# Patient Record
Sex: MALE | Race: WHITE | Employment: OTHER | URBAN - METROPOLITAN AREA
[De-identification: names, ages, dates, MRNs, and addresses within clinical notes are randomized per-mention and may not be internally consistent; named-entity substitution may affect disease eponyms.]

---

## 2018-06-11 PROBLEM — R31.29 OTHER MICROSCOPIC HEMATURIA: Status: ACTIVE | Noted: 2017-10-16

## 2018-06-11 PROBLEM — H93.12 TINNITUS OF LEFT EAR: Status: ACTIVE | Noted: 2017-10-16

## 2019-06-17 PROBLEM — M19.049 LOCALIZED, PRIMARY OSTEOARTHRITIS OF HAND: Status: ACTIVE | Noted: 2019-06-17

## 2019-06-17 PROBLEM — M79.609 PAIN IN LIMB: Status: ACTIVE | Noted: 2019-06-17

## 2019-06-17 PROBLEM — M67.40 GANGLION OF JOINT: Status: ACTIVE | Noted: 2019-06-17

## 2019-06-17 PROBLEM — M72.0 DUPUYTREN'S DISEASE OF PALM: Status: ACTIVE | Noted: 2019-06-17

## 2021-08-03 PROBLEM — N52.9 IMPOTENCE: Status: RESOLVED | Noted: 2021-08-03 | Resolved: 2021-08-03

## 2021-08-17 PROBLEM — N48.6 PEYRONIE'S DISEASE: Status: ACTIVE | Noted: 2020-08-18

## 2021-08-17 PROBLEM — I25.10 ATHEROSCLEROSIS OF LEFT ANTERIOR DESCENDING (LAD) ARTERY: Status: ACTIVE | Noted: 2020-06-09

## 2021-08-17 PROBLEM — N20.0 KIDNEY STONES: Status: ACTIVE | Noted: 2020-08-18

## 2021-08-17 PROBLEM — M50.30 DDD (DEGENERATIVE DISC DISEASE), CERVICAL: Status: ACTIVE | Noted: 2020-06-11

## 2021-08-17 PROBLEM — Z86.16 HISTORY OF COVID-19: Status: ACTIVE | Noted: 2021-02-16

## 2021-08-17 PROBLEM — J44.9 MILD CHRONIC OBSTRUCTIVE PULMONARY DISEASE (HCC): Status: ACTIVE | Noted: 2020-02-13

## 2021-08-27 ENCOUNTER — HOSPITAL ENCOUNTER (OUTPATIENT)
Dept: PHYSICAL THERAPY | Age: 66
Discharge: HOME | End: 2021-08-27
Payer: MEDICARE

## 2021-08-27 PROCEDURE — 97161 PT EVAL LOW COMPLEX 20 MIN: CPT

## 2021-08-27 PROCEDURE — 97110 THERAPEUTIC EXERCISES: CPT

## 2021-08-27 NOTE — PROGRESS NOTES
PHYSICAL THERAPY - DAILY TREATMENT NOTE     Patient Name: Talha Tong        Date: 2021  : 1955   YES Patient  Verified  Visit #:   1     Insurance: Payor: Partbernardo Peaks / Plan: [place] MEDICARE PART A & B / Product Type: Medicare /      In time: 681 Out time: 275   Total Treatment Time: 35     Medicare/BCBS Time Tracking (below)   Total Timed Codes (min):  15 1:1 Treatment Time:  15     TREATMENT AREA =  Low back pain [M54.5]    SUBJECTIVE    Pain Level (on 0 to 10 scale):  7  / 10   Medication Changes/New allergies or changes in medical history, any new surgeries or procedures?     NO    If yes, update Summary List   Subjective Functional Status/Changes:  []  No changes reported     See eval /POC         OBJECTIVE  Modalities Rationale:     decrease pain to improve patient's ability to return to PLOF      min [] Estim, type/location:                                      []  att     []  unatt     []  w/US     []  w/ice    []  w/heat    min []  Mechanical Traction: type/lbs                   []  pro   []  sup   []  int   []  cont    []  before manual    []  after manual    min []  Ultrasound, settings/location:      min []  Iontophoresis w/ dexamethasone, location:                                               []  take home patch       []  in clinic    min []  Ice     []  Heat    location/position:     min []  Vasopneumatic Device, press/temp:     min []  Other:    [] Skin assessment post-treatment (if applicable):    []  intact    []  redness- no adverse reaction     []redness - adverse reaction:      15 min Therapeutic Exercise:  [x]  See flow sheet   Rationale:      increase ROM and increase strength to improve the patients ability to return to PLOF      min Manual Therapy: Technique:      [] S/DTM []IASTM []PROM [] Passive Stretching   []manual TPR    []Jt manipulation:Gr I [] II []  III [] IV[]  []REIL with manual OP  Treatment Area:     Rationale:      decrease pain, increase ROM, increase tissue extensibility and decrease trigger points to improve patient's ability to return to PLOF     min Neuromuscular Re-ed: [x]  See flow sheet   Rationale:      improve coordination, improve balance, increase proprioception and dec dizziness to improve the patients ability to return to PLOF       x min Self Care: Extension principle   Rationale:    increase ROM, increase strength and improve coordination to improve the patients ability to return to PLOF    Billed With/As:   [] TE   [] TA   [] Neuro   [] Self Care Patient Education: [x] Review HEP    [] Progressed/Changed HEP based on:   [] positioning   [] body mechanics   [] transfers   [] heat/ice application    [] other:        Other Objective/Functional Measures:    See eval/ POC     Post Treatment Pain Level (on 0 to 10) scale:   <7  / 10     ASSESSMENT    X  See POC     PLAN    [x]  Upgrade activities as tolerated {YES) Continue plan of care   []  Discharge due to :    []  Other:      Therapist: Aren Leal PT    Date: 8/27/2021 Time: 9:07 AM     Future Appointments   Date Time Provider Alessandra Redd   10/12/2021  4:00 PM Oseas Chowdary MD [facility] LISANDRO SCHED   8/23/2022  8:40 AM Joanna Fishman MD [id number]

## 2021-08-27 NOTE — PROGRESS NOTES
[address] [place] [address] PHYSICAL THERAPY AT [facility] [address], [address] - Phone: [phone]  Fax: [phone] / [facility]  Patient Name: Rajeev Guevara : 1955   Medical   Diagnosis: Low back pain [M54.5] Treatment Diagnosis: LBP   Onset Date: Aug 2021- 3 weeks prior to IE     Referral Source: Criss Santa MD Start of Care [facility]): 2021   Prior Hospitalization: See medical history Provider #: [id number]   Prior Level of Function: No back pain with ADLs   Comorbidities: na   Medications: Verified on Patient Summary List   The Plan of Care and following information is based on the information from the initial evaluation.   ================================================================  Assessment / key information: Rajeev Guevara is a 72 y.o.  yo male with Dx of Low back pain [M54.5]. He reports onset of LBP following prolonged period sitting on a bench. The next morning, he worke with significant pain and stiffness. Symptoms have progressed. He denies radic symptoms. Pain is worse in the morning and with sitting. On assessment, Ismael Dowell stands with flexed trunk posture. He demonstrates Lx ROM as follows: flex= 75%, ext= 50%, R ROT= WNL, L ROT= WNL. LE strength and ROM are WNLs. SLR test is negative. Repeated movement tests suggests directional preference for extension- indicating disc derangement. FOTO score= 48%.  ================================================================  Eval Complexity: History LOW Complexity : Zero comorbidities / personal factors that will impact the outcome / POC;  Examination  HIGH Complexity : 4+ Standardized tests and measures addressing body structure, function, activity limitation and / or participation in recreation ; Presentation MEDIUM Complexity : Evolving with changing characteristics ;   Decision Making MEDIUM Complexity : FOTO score of 26-74; Overall Complexity LOW   Problem List: pain affecting function, decrease ROM, decrease strength, impaired gait/ balance, decrease ADL/ functional abilitiies, decrease activity tolerance and decrease flexibility/ joint mobility   Treatment Plan may include any combination of the following: Therapeutic exercise, Therapeutic activities, Neuromuscular re-education, Physical agent/modality, Manual therapy and Patient education  Patient / Family readiness to learn indicated by: asking questions, trying to perform skills and interest  Persons(s) to be included in education: patient (P)  Barriers to Learning/Limitations: None  Measures taken, if barriers to learning:    Patient Goal (s): Pain relief   Patient self reported health status: excellent  Rehabilitation Potential: good   Short Term Goals: To be accomplished in  2  weeks:  1 Patient will report >= 25% improvement in symptoms with ADLs. 2 Patient will be educated in extension progression principles to alleviate symptoms. 3 Patient will demonstrate full upright posture.  Long Term Goals: To be accomplished in  4-6  weeks:  1 Patient to report >= 70% improvement in symptoms with ADLs. 2 Patient will be independent with finalized HEP/ self maintenance. 3 Increase FOTO score >=  70% to indicate improved function with use of LS. 4 Restore full AROM for improved ADL participation. Frequency / Duration:   Patient to be seen  2  times per week for 4-6  weeks:  Patient / Caregiver education and instruction: self care, activity modification and exercises    Therapist Signature: Liana Ya PT Date: 1/55/3278   Certification Period: 8/27/21 to 11/24/21 Time: 9:08 AM   ===================================================================  I certify that the above Physical Therapy Services are being furnished while the patient is under my care. I agree with the treatment plan and certify that this therapy is necessary.     Physician Signature:        Date:       Time:        José Miguel Irving MD  Please sign and return to [facility] or you may fax the signed copy to [phone]. Thank you.

## 2021-09-01 ENCOUNTER — HOSPITAL ENCOUNTER (OUTPATIENT)
Dept: PHYSICAL THERAPY | Age: 66
Discharge: HOME | End: 2021-09-01
Payer: MEDICARE

## 2021-09-01 PROCEDURE — 97110 THERAPEUTIC EXERCISES: CPT

## 2021-09-01 PROCEDURE — 97140 MANUAL THERAPY 1/> REGIONS: CPT

## 2021-09-01 NOTE — PROGRESS NOTES
PHYSICAL THERAPY - DAILY TREATMENT NOTE     Patient Name: Rajeev Guevara        Date: 2021  : 1955   YES Patient  Verified  Visit #:   2     Insurance: Payor: Carlos A Johnson / Plan: [place] MEDICARE PART A & B / Product Type: Medicare /      In time: 750 Out time: 830   Total Treatment Time: 40     Medicare/BCBS Time Tracking (below)   Total Timed Codes (min):   1:1 Treatment Time:  30     TREATMENT AREA =  Low back pain [M54.5]    SUBJECTIVE    Pain Level (on 0 to 10 scale):  4  / 10   Medication Changes/New allergies or changes in medical history, any new surgeries or procedures? NO    If yes, update Summary List   Subjective Functional Status/Changes:  []  No changes reported   Feel better. Saw ortho--\"have spine of a 26 yo)    Pain feel muscular--R LBP.   No buttock/ leg pain           OBJECTIVE  Modalities Rationale:     decrease pain and increase tissue extensibility to improve patient's ability to perform ADLs with less pain      min [] Estim, type/location:                                      []  att     []  unatt     []  w/US     []  w/ice    []  w/heat    min []  Mechanical Traction: type/lbs                   []  pro   []  sup   []  int   []  cont    []  before manual    []  after manual    min []  Ultrasound, settings/location:      min []  Iontophoresis w/ dexamethasone, location:                                               []  take home patch       []  in clinic   10 min []  Ice     [x]  Heat    location/position:     min []  Vasopneumatic Device, press/temp:              cold / med If using vaso       pre-treatment girth :       post-treatment girth :       measured at (location) :     min []  Other:    [x] Skin assessment post-treatment (if applicable):    [x]  intact    [x]  redness- no adverse reaction     []redness - adverse reaction:      15 min Manual Therapy: Technique:      [x] S/DTM []IASTM []PROM [] Passive Stretching   [x]manual TPR  [] SOR [] man traction  []Jt manipulation:Gr I [] II []  III [] IV[]   [x] OP with REIL    []   Treatment Area:  LB      *manual therapy interventions were performed at a separate and distinct time from   the therapeutic activities interventions. Rationale:      decrease pain, increase ROM, increase tissue extensibility and decrease trigger points to improve patient's ability to perform ADLs with less pain    15 min Therapeutic Exercise:  [x]  See flow sheet   Rationale:      increase ROM, increase strength and dec neural compromise to improve the patients ability to perform ADLs with less pain           Billed With/As:   [x] TE   [] TA   [] Neuro   [] Self Care Patient Education: [x] Review HEP    [] Progressed/Changed HEP based on:   [x] positioning   [x] body mechanics   [] transfers   [] heat/ice application    [] other:        Other Objective/Functional Measures:    Upright posture    Added hip stretch   Post Treatment Pain Level (on 0 to 10) scale:   0  / 10     ASSESSMENT  Assessment/Changes in Function:      Functional improvement:  improved posture  Functional limitation:  LBP with all ADLs           Patient will continue to benefit from skilled PT services to modify and progress therapeutic interventions, address functional mobility deficits, address ROM deficits, address strength deficits, analyze and address soft tissue restrictions, analyze and cue movement patterns, analyze and modify body mechanics/ergonomics and assess and modify postural abnormalities to attain remaining goals.    Progress toward goals / Updated goals:    Improved posture         []  See Progress Note/Recertification    PLAN    [x]  Upgrade activities as tolerated {YES) Continue plan of care   []  Discharge due to :    []  Other:      Therapist: Jordan Estes PT    Date: 9/1/2021 Time: 7:51 AM     Future Appointments   Date Time Provider Alessandra Redd   9/1/2021  8:00 AM Rogelio Cline PT [facility]   9/3/2021  8:00 AM Rogelio Cline PT [facility]   9/8/2021  8:00 AM Rodolfo Colla, PT [facility]   9/10/2021  8:00 AM Rodolfo Colla, PT [facility]   9/14/2021  8:30 AM Renuka Raymond, PTA [facility]   9/16/2021  8:00 AM Rodolfo Colla, PT [facility]   9/21/2021  8:00 AM Rodolfo Colla, PT [facility]   9/24/2021  8:15 AM Renuka Raymond, PTA [facility]   9/27/2021  8:00 AM Rodolfo Colla, PT [facility]   9/30/2021  8:00 AM Rodolfo Colla, PT [facility]   10/12/2021  4:00 PM Lg Cage MD [facility]   8/23/2022  8:40 AM Mary Kamara MD [facility]

## 2021-09-03 ENCOUNTER — HOSPITAL ENCOUNTER (OUTPATIENT)
Dept: PHYSICAL THERAPY | Age: 66
Discharge: HOME | End: 2021-09-03
Payer: MEDICARE

## 2021-09-03 PROCEDURE — 97140 MANUAL THERAPY 1/> REGIONS: CPT

## 2021-09-03 PROCEDURE — 97110 THERAPEUTIC EXERCISES: CPT

## 2021-09-03 NOTE — PROGRESS NOTES
PHYSICAL THERAPY - DAILY TREATMENT NOTE     Patient Name: Charlotte Guzmán        Date: 9/3/2021  : 1955   YES Patient  Verified  Visit #:   3     Insurance: Payor: Yaya Ely / Plan: [place] MEDICARE PART A & B / Product Type: Medicare /      In time: 207 Out time: 831   Total Treatment Time: 40     Medicare/BCBS Time Tracking (below)   Total Timed Codes (min):  30 1:1 Treatment Time:  30     TREATMENT AREA =  Low back pain [M54.5]    SUBJECTIVE    Pain Level (on 0 to 10 scale):  2  / 10   Medication Changes/New allergies or changes in medical history, any new surgeries or procedures? NO    If yes, update Summary List   Subjective Functional Status/Changes:  []  No changes reported   I can tie my shoes now. Doing much better.     No leg or buttock pain           OBJECTIVE  Modalities Rationale:     decrease pain and increase tissue extensibility to improve patient's ability to relax      min [] Estim, type/location:                                      []  att     []  unatt     []  w/US     []  w/ice    []  w/heat    min []  Mechanical Traction: type/lbs                   []  pro   []  sup   []  int   []  cont    []  before manual    []  after manual    min []  Ultrasound, settings/location:      min []  Iontophoresis w/ dexamethasone, location:                                               []  take home patch       []  in clinic   10 min []  Ice     [x]  Heat    location/position:     min []  Vasopneumatic Device, press/temp:              cold / med If using vaso       pre-treatment girth :       post-treatment girth :       measured at (location) :     min []  Other:    [x] Skin assessment post-treatment (if applicable):    [x]  intact    [x]  redness- no adverse reaction     []redness - adverse reaction:      10 min Manual Therapy: Technique:      [x] S/DTM []IASTM []PROM [] Passive Stretching   [x]manual TPR  [] SOR [] man traction  []Jt manipulation:Gr I [] II []  III [] IV[]   [x] OP with KELY    []   Treatment Area:  LB      *manual therapy interventions were performed at a separate and distinct time from   the therapeutic activities interventions. Rationale:      decrease pain, increase ROM, increase tissue extensibility and decrease trigger points to improve patient's ability to perform ADLs    20 min Therapeutic Exercise:  [x]  See flow sheet   Rationale:      increase ROM, increase strength and dec neural compromise to improve the patients ability to perform ADLs         Billed With/As:   [x] TE   [] TA   [] Neuro   [] Self Care Patient Education: [x] Review HEP    [] Progressed/Changed HEP based on:   [] positioning   [] body mechanics   [] transfers   [] heat/ice application    [] other:        Other Objective/Functional Measures:    Ext ROM= 75%    Improved mobility    Initiated core stability ex   Post Treatment Pain Level (on 0 to 10) scale:   2-3  / 10     ASSESSMENT  Assessment/Changes in Function:      Functional improvement:  ability to bend forward  Functional limitation:  extension principle precautions           Patient will continue to benefit from skilled PT services to modify and progress therapeutic interventions, address functional mobility deficits, address ROM deficits, address strength deficits, analyze and modify body mechanics/ergonomics and assess and modify postural abnormalities to attain remaining goals.    Progress toward goals / Updated goals:    Good Progress to    [] STG    [x] LTG  1 as shown by pt report of improvement         []  See Progress Note/Recertification    PLAN    [x]  Upgrade activities as tolerated {YES) Continue plan of care   []  Discharge due to :    []  Other:      Therapist: Fariba Savage PT    Date: 9/3/2021 Time: 7:54 AM     Future Appointments   Date Time Provider Alessandra Redd   9/3/2021  8:00 AM Junior Webster PT [facility]   9/8/2021  8:00 AM Junior Webster PT [facility]   9/10/2021  8:00 AM Junior Webster PT [facility] 9/14/2021  8:30 AM Zoltan Lara PTA [facility]   9/16/2021  8:00 AM Lance Santacruz, PT [facility]   9/21/2021  8:00 AM Lance Santacruz, PT [facility]   9/24/2021  8:15 AM Zoltan Lara PTA [facility]   9/27/2021  8:00 AM Lance Santacruz, PT [facility]   9/30/2021  8:00 AM Lance Santacruz, PT [facility]   10/12/2021  4:00 PM Doron Berry MD [facility]   8/23/2022  8:40 AM Phuc Patel MD [id number]

## 2021-09-08 ENCOUNTER — APPOINTMENT (OUTPATIENT)
Dept: PHYSICAL THERAPY | Age: 66
End: 2021-09-08
Payer: MEDICARE

## 2021-09-10 ENCOUNTER — HOSPITAL ENCOUNTER (OUTPATIENT)
Dept: PHYSICAL THERAPY | Age: 66
Discharge: HOME | End: 2021-09-10
Payer: MEDICARE

## 2021-09-10 PROCEDURE — 97110 THERAPEUTIC EXERCISES: CPT

## 2021-09-10 PROCEDURE — 97140 MANUAL THERAPY 1/> REGIONS: CPT

## 2021-09-10 NOTE — PROGRESS NOTES
PHYSICAL THERAPY - DAILY TREATMENT NOTE     Patient Name: Светлана Caba        Date: 9/10/2021  : 1955   YES Patient  Verified  Visit #:   4     Insurance: Payor: Angela Melton / Plan: VA MEDICARE PART A & B / Product Type: Medicare /      In time: 800 Out time: 845   Total Treatment Time: 45     Medicare/BCBS Time Tracking (below)   Total Timed Codes (min):  35 1:1 Treatment Time:  35     TREATMENT AREA =  Low back pain [M54.5]    SUBJECTIVE    Pain Level (on 0 to 10 scale):  2  / 10   Medication Changes/New allergies or changes in medical history, any new surgeries or procedures? NO    If yes, update Summary List   Subjective Functional Status/Changes:  []  No changes reported   Much better, ROM is improved. Able to dress without difficulty.   Residual pain R LB           OBJECTIVE  Modalities Rationale:     decrease pain and increase tissue extensibility to improve patient's ability to perform ADLs      min [] Estim, type/location:                                      []  att     []  unatt     []  w/US     []  w/ice    []  w/heat    min []  Mechanical Traction: type/lbs                   []  pro   []  sup   []  int   []  cont    []  before manual    []  after manual    min []  Ultrasound, settings/location:      min []  Iontophoresis w/ dexamethasone, location:                                               []  take home patch       []  in clinic   10 min []  Ice     [x]  Heat    location/position:     min []  Vasopneumatic Device, press/temp:              cold / med If using vaso       pre-treatment girth :       post-treatment girth :       measured at (location) :     min []  Other:    [x] Skin assessment post-treatment (if applicable):    [x]  intact    []  redness- no adverse reaction     []redness - adverse reaction:      15 min Manual Therapy: Technique:      [x] S/DTM []IASTM []PROM [] Passive Stretching   [x]manual TPR  [] SOR [] man traction  []Jt manipulation:Gr I [] II []  III [] IV[]   [x] OP with REIL    []   Treatment Area:  LB      *manual therapy interventions were performed at a separate and distinct time from   the therapeutic activities interventions. Rationale:      decrease pain, increase ROM, increase tissue extensibility and decrease trigger points to improve patient's ability to perform ADLs    20 min Therapeutic Exercise:  [x]  See flow sheet   Rationale:      increase ROM and dec neural compromise  to improve the patients ability to perform ADLs         Billed With/As:   [x] TE   [] TA   [] Neuro   [] Self Care Patient Education: [x] Review HEP    [] Progressed/Changed HEP based on:   [x] positioning   [x] body mechanics   [] transfers   [] heat/ice application    [] other:        Other Objective/Functional Measures:    Inc tone R>L QL    Added PHE, BKFO   Post Treatment Pain Level (on 0 to 10) scale:   0  / 10     ASSESSMENT  Assessment/Changes in Function:      Functional improvement:  dec intensity of pain, improving movility for dressing/ ADLs   Functional limitation:  mild R LBP           Patient will continue to benefit from skilled PT services to modify and progress therapeutic interventions, address functional mobility deficits, address ROM deficits, address strength deficits, analyze and address soft tissue restrictions, analyze and cue movement patterns and analyze and modify body mechanics/ergonomics to attain remaining goals.    Progress toward goals / Updated goals:    Good Progress to    [] STG    [x] LTG  1 as shown by pt report         []  See Progress Note/Recertification    PLAN    [x]  Upgrade activities as tolerated {YES) Continue plan of care   []  Discharge due to :    []  Other:      Therapist: Tello Mccullough PT    Date: 9/10/2021 Time: 8:01 AM     Future Appointments   Date Time Provider Alessandra Redd   9/14/2021  8:30 AM Marilyn Jones PTA [facility]   9/16/2021  8:00 AM Joe Schwarz PT [facility]   9/21/2021  8:00 AM Luis Chris Dandre Job [facility]   9/24/2021  8:15 AM Juan Yo PTA [facility]   9/27/2021  8:00 AM Jackie Duran, PT [facility]   9/30/2021  8:00 AM Jackie Duran, PT [facility]   10/12/2021  4:00 PM Twan Avalos MD [facility]   8/23/2022  8:40 AM Vandana Aden MD [facility]

## 2021-09-14 ENCOUNTER — HOSPITAL ENCOUNTER (OUTPATIENT)
Dept: PHYSICAL THERAPY | Age: 66
Discharge: HOME | End: 2021-09-14
Payer: MEDICARE

## 2021-09-14 PROCEDURE — 97140 MANUAL THERAPY 1/> REGIONS: CPT

## 2021-09-14 PROCEDURE — 97110 THERAPEUTIC EXERCISES: CPT

## 2021-09-16 ENCOUNTER — APPOINTMENT (OUTPATIENT)
Dept: PHYSICAL THERAPY | Age: 66
End: 2021-09-16
Payer: MEDICARE

## 2021-09-21 ENCOUNTER — HOSPITAL ENCOUNTER (OUTPATIENT)
Dept: PHYSICAL THERAPY | Age: 66
Discharge: HOME | End: 2021-09-21
Payer: MEDICARE

## 2021-09-21 PROCEDURE — 97110 THERAPEUTIC EXERCISES: CPT

## 2021-09-21 PROCEDURE — 97140 MANUAL THERAPY 1/> REGIONS: CPT

## 2021-09-21 NOTE — PROGRESS NOTES
PHYSICAL THERAPY - DAILY TREATMENT NOTE     Patient Name: Rikki Calvert        Date: 2021  : 1955   YES Patient  Verified  Visit #:   6     Insurance: Payor: Evelin Wilkins / Plan: [place] PART [place] & B / Product Type: Medicare /      In time: 075 Out time: 840   Total Treatment Time: 45     Medicare/BCBS Time Tracking (below)   Total Timed Codes (min):  35 1:1 Treatment Time:  35     TREATMENT AREA =  Low back pain [M54.5]    SUBJECTIVE    Pain Level (on 0 to 10 scale):  0  / 10   Medication Changes/New allergies or changes in medical history, any new surgeries or procedures? NO    If yes, update Summary List   Subjective Functional Status/Changes:  []  No changes reported   Much better, but still have R>L issues on occasion. Noted with working on boat. Requesting to be placed on hold.       95% improved           OBJECTIVE  Modalities Rationale:     decrease pain and increase tissue extensibility to improve patient's ability to perform ADLs      min [] Estim, type/location:                                      []  att     []  unatt     []  w/US     []  w/ice    []  w/heat    min []  Mechanical Traction: type/lbs                   []  pro   []  sup   []  int   []  cont    []  before manual    []  after manual    min []  Ultrasound, settings/location:      min []  Iontophoresis w/ dexamethasone, location:                                               []  take home patch       []  in clinic   10 min []  Ice     [x]  Heat    location/position:     min []  Vasopneumatic Device, press/temp:              cold / med If using vaso       pre-treatment girth :       post-treatment girth :       measured at (location) :     min []  Other:    [x] Skin assessment post-treatment (if applicable):    [x]  intact    []  redness- no adverse reaction     []redness - adverse reaction:      10 min Manual Therapy: Technique:      [x] S/DTM []IASTM []PROM [] Passive Stretching   [x]manual TPR  [] SOR [] man traction  []Jt manipulation:Gr I [] II []  III [] IV[]   [x] OP with REIL    []   Treatment Area:  LB    *manual therapy interventions were performed at a separate and distinct time from   the therapeutic activities interventions.    Rationale:      decrease pain, increase ROM, increase tissue extensibility and decrease trigger points to improve patient's ability to perform ADLs    25 min Therapeutic Exercise:  [x]  See flow sheet   Rationale:      increase ROM, increase strength and dec neural compromise to improve the patients ability to perform ADLs       Billed With/As:   [x] TE   [] TA   [] Neuro   [] Self Care Patient Education: [x] Review HEP    [x] Progressed/Changed HEP based on:   [x] positioning   [] body mechanics   [] transfers   [] heat/ice application    [] other:        Other Objective/Functional Measures:    Ed on gym activity    Lx ROM: flex= 75%, ext= WNL, B rot= WNL   Post Treatment Pain Level (on 0 to 10) scale:   0  / 10     ASSESSMENT  Assessment/Changes in Function:      Functional improvement:  working on boat,     Has returned to ADLs               [x]  See Progress Note/Recertification    PLAN    []  Upgrade activities as tolerated {YES) Continue plan of care   []  Discharge due to :    [x]  Other: Hold 30 days     Therapist: Aren Leal PT    Date: 9/21/2021 Time: 7:54 AM     Future Appointments   Date Time Provider Alessandra Redd   9/21/2021  8:00 AM Diomedes Ramirez, PT [facility]   9/24/2021  8:15 AM Andrew Shepherd PTA [facility]   9/27/2021  8:00 AM Diomedes Ramirez PT [facility]   9/30/2021  8:00 AM Diomedes Ramirez PT [facility]   10/12/2021  4:00 PM Oseas Chowdary MD [facility] LISANDRO SCHED   8/23/2022  8:40 AM Joanna Fishman MD [id number] [facility]

## 2021-09-24 ENCOUNTER — APPOINTMENT (OUTPATIENT)
Dept: PHYSICAL THERAPY | Age: 66
End: 2021-09-24
Payer: MEDICARE

## 2021-09-27 ENCOUNTER — APPOINTMENT (OUTPATIENT)
Dept: PHYSICAL THERAPY | Age: 66
End: 2021-09-27
Payer: MEDICARE

## 2021-09-30 ENCOUNTER — APPOINTMENT (OUTPATIENT)
Dept: PHYSICAL THERAPY | Age: 66
End: 2021-09-30
Payer: MEDICARE

## 2021-11-10 NOTE — PROGRESS NOTES
[address] [place] [address] PHYSICAL THERAPY AT [facility] [address] [place], [address]  Phone: [phone]  Fax: [phone]  DISCHARGE SUMMARY FOR PHYSICAL THERAPY          Patient Name: Bentley Arenas : 1955   Treatment/Medical Diagnosis: Low back pain [M54.5]   Onset Date: Aug 2021    Referral Source: Joel Doe MD [facility]): 21   Prior Hospitalization: See Medical History Provider #: [id number]   Prior Level of Function: No LBP with ADLs   Comorbidities: na   Medications: Verified on Patient Summary List   Visits from [facility]: 6 Missed Visits: -     Previous Goals:  1 Patient to report >= 70% improvement in symptoms with ADLs. 2 Patient will be independent with finalized HEP/ self maintenance. 3 Increase FOTO score >=  70% to indicate improved function with use of LS. 4 Restore full AROM for improved ADL participation. Prior Level/Current Level:  1) Prior Level: na   Current Level: 95%   Goal Met? yes  2) Prior Level: na   Current Level: indep   Goal Met? yes  3) Prior Level: 48   Current Level: 83   Goal Met? yes  4) Prior Level:flex= 75%, ext= 50%, R ROT= WNL, L ROT= WNL. Current Level: Lx ROM: flex= 75%, ext= WNL, B rot= WNL   Goal Met? Partially met    Key Functional Changes/Progress: Patient has progressed well through this course of PT. He reports 95% overall improvement and has returned to all ADLs without difficulty. He was placed on hold and instructed to return if symptoms returned. It has been over 6 weeks and patient has not returned    Assessments/Recommendations: Discontinue therapy. Progressing towards or have reached established goals. If you have any questions/comments please contact us directly at [phone]. Thank you for allowing us to assist in the care of your patient.     Therapist Signature: Nathanael Mcmanus PT Date: 11/10/21   Reporting Period: 21 to 21 Time: 2:16 PM

## 2022-09-07 ENCOUNTER — APPOINTMENT (OUTPATIENT)
Dept: PHYSICAL THERAPY | Age: 67
End: 2022-09-07